# Patient Record
Sex: FEMALE | Race: BLACK OR AFRICAN AMERICAN | NOT HISPANIC OR LATINO | URBAN - METROPOLITAN AREA
[De-identification: names, ages, dates, MRNs, and addresses within clinical notes are randomized per-mention and may not be internally consistent; named-entity substitution may affect disease eponyms.]

---

## 2020-09-14 ENCOUNTER — TELEPHONE ENCOUNTER (OUTPATIENT)
Dept: URBAN - METROPOLITAN AREA CLINIC 118 | Facility: CLINIC | Age: 45
End: 2020-09-14

## 2020-11-04 ENCOUNTER — OFFICE VISIT (OUTPATIENT)
Dept: URBAN - METROPOLITAN AREA CLINIC 118 | Facility: CLINIC | Age: 45
End: 2020-11-04
Payer: COMMERCIAL

## 2020-11-04 ENCOUNTER — LAB OUTSIDE AN ENCOUNTER (OUTPATIENT)
Dept: URBAN - METROPOLITAN AREA CLINIC 118 | Facility: CLINIC | Age: 45
End: 2020-11-04

## 2020-11-04 DIAGNOSIS — R10.13 EPIGASTRIC PAIN: ICD-10-CM

## 2020-11-04 DIAGNOSIS — Z12.11 SCREEN FOR COLON CANCER: ICD-10-CM

## 2020-11-04 DIAGNOSIS — K58.9 IBS (IRRITABLE BOWEL SYNDROME)-DIARRHEA: ICD-10-CM

## 2020-11-04 DIAGNOSIS — R11.0 NAUSEA: ICD-10-CM

## 2020-11-04 PROBLEM — 10743008 IRRITABLE BOWEL SYNDROME: Status: ACTIVE | Noted: 2020-11-04

## 2020-11-04 PROCEDURE — 99214 OFFICE O/P EST MOD 30 MIN: CPT | Performed by: INTERNAL MEDICINE

## 2020-11-04 RX ORDER — ALPRAZOLAM 0.5 MG/1
1 TABLET TABLET ORAL TWICE A DAY
Qty: 60 TABLET | Refills: 5 | OUTPATIENT
Start: 2020-11-04

## 2020-11-04 RX ORDER — ELUXADOLINE 100 MG/1
1 TABLET WITH FOOD TABLET, FILM COATED ORAL TWICE A DAY
Qty: 60 TABLET | Refills: 6 | OUTPATIENT
Start: 2020-11-04 | End: 2021-06-02

## 2020-11-04 NOTE — HPI-TODAY'S VISIT:
pt h/o IBS-d stable on Virberzi presents for GI eval. Pt going through menapause and now reprots intermittent unexplained nausea. No obvious food association. Reports also noted occasional epigastric pain. Denies severe gerd, denies dysphagia. Denies nsaids/asa. Of note also has feeling food stuck in back throat before swallowing (told to see ENT). Denies LGI symptoms except chronic loose stool. pt due for colonoscopy.

## 2020-12-22 ENCOUNTER — OFFICE VISIT (OUTPATIENT)
Dept: URBAN - METROPOLITAN AREA SURGERY CENTER 23 | Facility: SURGERY CENTER | Age: 45
End: 2020-12-22

## 2020-12-28 ENCOUNTER — TELEPHONE ENCOUNTER (OUTPATIENT)
Dept: URBAN - METROPOLITAN AREA CLINIC 96 | Facility: CLINIC | Age: 45
End: 2020-12-28

## 2020-12-29 ENCOUNTER — TELEPHONE ENCOUNTER (OUTPATIENT)
Dept: URBAN - METROPOLITAN AREA CLINIC 92 | Facility: CLINIC | Age: 45
End: 2020-12-29

## 2020-12-29 ENCOUNTER — OFFICE VISIT (OUTPATIENT)
Dept: URBAN - METROPOLITAN AREA SURGERY CENTER 23 | Facility: SURGERY CENTER | Age: 45
End: 2020-12-29
Payer: COMMERCIAL

## 2020-12-29 DIAGNOSIS — K22.2 ACQUIRED ESOPHAGEAL RING: ICD-10-CM

## 2020-12-29 DIAGNOSIS — K29.30 CHRONIC SUPERFICIAL GASTRITIS: ICD-10-CM

## 2020-12-29 DIAGNOSIS — Z12.11 COLON CANCER SCREENING: ICD-10-CM

## 2020-12-29 DIAGNOSIS — K22.8 COLUMNAR-LINED ESOPHAGUS: ICD-10-CM

## 2020-12-29 PROCEDURE — 45378 DIAGNOSTIC COLONOSCOPY: CPT | Performed by: INTERNAL MEDICINE

## 2020-12-29 PROCEDURE — G8907 PT DOC NO EVENTS ON DISCHARG: HCPCS | Performed by: INTERNAL MEDICINE

## 2020-12-29 PROCEDURE — 43450 DILATE ESOPHAGUS 1/MULT PASS: CPT | Performed by: INTERNAL MEDICINE

## 2020-12-29 PROCEDURE — 43239 EGD BIOPSY SINGLE/MULTIPLE: CPT | Performed by: INTERNAL MEDICINE

## 2020-12-29 PROCEDURE — G9935 CANC NOT DETECTD DURING SRCN: HCPCS | Performed by: INTERNAL MEDICINE

## 2020-12-29 RX ORDER — ELUXADOLINE 100 MG/1
1 TABLET WITH FOOD TABLET, FILM COATED ORAL TWICE A DAY
Qty: 60 TABLET | Refills: 6 | Status: ACTIVE | COMMUNITY
Start: 2020-11-04 | End: 2021-06-02

## 2020-12-29 RX ORDER — ALPRAZOLAM 0.5 MG/1
1 TABLET TABLET ORAL TWICE A DAY
Qty: 60 TABLET | Refills: 5 | Status: ACTIVE | COMMUNITY
Start: 2020-11-04

## 2020-12-29 RX ORDER — ELUXADOLINE 100 MG/1
1 TABLET WITH FOOD TABLET, FILM COATED ORAL TWICE A DAY
Qty: 60 TABLET | Refills: 6
Start: 2020-11-04

## 2021-02-15 ENCOUNTER — TELEPHONE ENCOUNTER (OUTPATIENT)
Dept: URBAN - METROPOLITAN AREA CLINIC 118 | Facility: CLINIC | Age: 46
End: 2021-02-15

## 2021-02-16 ENCOUNTER — TELEPHONE ENCOUNTER (OUTPATIENT)
Dept: URBAN - METROPOLITAN AREA CLINIC 118 | Facility: CLINIC | Age: 46
End: 2021-02-16

## 2021-02-18 ENCOUNTER — TELEPHONE ENCOUNTER (OUTPATIENT)
Dept: URBAN - METROPOLITAN AREA CLINIC 118 | Facility: CLINIC | Age: 46
End: 2021-02-18

## 2021-02-18 RX ORDER — PROMETHAZINE HYDROCHLORIDE 25 MG/1
1 TABLET AS NEEDED TABLET ORAL EVERY 8 HOURS
Qty: 90 TABLET60 | Refills: 5 | OUTPATIENT
Start: 2021-02-19 | End: 2021-08-18

## 2021-03-25 ENCOUNTER — OUT OF OFFICE VISIT (OUTPATIENT)
Dept: URBAN - METROPOLITAN AREA MEDICAL CENTER 16 | Facility: MEDICAL CENTER | Age: 46
End: 2021-03-25
Payer: COMMERCIAL

## 2021-03-25 DIAGNOSIS — G62.9 NEUROPATHY: ICD-10-CM

## 2021-03-25 DIAGNOSIS — K76.0 HEPATIC STEATOSIS: ICD-10-CM

## 2021-03-25 DIAGNOSIS — R74.8 ABNORMAL ALKALINE PHOSPHATASE TEST: ICD-10-CM

## 2021-03-25 DIAGNOSIS — R74.01 ALT (SGPT) LEVEL RAISED: ICD-10-CM

## 2021-03-25 PROCEDURE — G8427 DOCREV CUR MEDS BY ELIG CLIN: HCPCS | Performed by: PEDIATRICS

## 2021-03-25 PROCEDURE — 99222 1ST HOSP IP/OBS MODERATE 55: CPT | Performed by: PEDIATRICS

## 2021-03-26 ENCOUNTER — OUT OF OFFICE VISIT (OUTPATIENT)
Dept: URBAN - METROPOLITAN AREA MEDICAL CENTER 16 | Facility: MEDICAL CENTER | Age: 46
End: 2021-03-26
Payer: COMMERCIAL

## 2021-03-26 DIAGNOSIS — E53.8 FOLATE DEFICIENCY: ICD-10-CM

## 2021-03-26 DIAGNOSIS — R74.8 ABNORMAL ALKALINE PHOSPHATASE TEST: ICD-10-CM

## 2021-03-26 DIAGNOSIS — F10.10 AA (ALCOHOL ABUSE): ICD-10-CM

## 2021-03-26 DIAGNOSIS — R20.0 NUMBNESS: ICD-10-CM

## 2021-03-26 PROCEDURE — 99232 SBSQ HOSP IP/OBS MODERATE 35: CPT | Performed by: INTERNAL MEDICINE

## 2021-05-19 ENCOUNTER — OUT OF OFFICE VISIT (OUTPATIENT)
Dept: URBAN - METROPOLITAN AREA MEDICAL CENTER 16 | Facility: MEDICAL CENTER | Age: 46
End: 2021-05-19
Payer: COMMERCIAL

## 2021-05-19 ENCOUNTER — TELEPHONE ENCOUNTER (OUTPATIENT)
Dept: URBAN - METROPOLITAN AREA CLINIC 92 | Facility: CLINIC | Age: 46
End: 2021-05-19

## 2021-05-19 DIAGNOSIS — R74.8 ABNORMAL ALKALINE PHOSPHATASE TEST: ICD-10-CM

## 2021-05-19 DIAGNOSIS — K82.8 ADENOMYOSIS OF GALLBLADDER: ICD-10-CM

## 2021-05-19 DIAGNOSIS — R10.84 ABDOMINAL CRAMPING, GENERALIZED: ICD-10-CM

## 2021-05-19 DIAGNOSIS — K76.0 HEPATIC STEATOSIS: ICD-10-CM

## 2021-05-19 DIAGNOSIS — K59.09 CHRONIC CONSTIPATION: ICD-10-CM

## 2021-05-19 PROCEDURE — 99232 SBSQ HOSP IP/OBS MODERATE 35: CPT | Performed by: PEDIATRICS

## 2021-05-19 PROCEDURE — 99222 1ST HOSP IP/OBS MODERATE 55: CPT | Performed by: PEDIATRICS

## 2021-05-19 PROCEDURE — G8427 DOCREV CUR MEDS BY ELIG CLIN: HCPCS | Performed by: PEDIATRICS

## 2021-05-19 RX ORDER — ALPRAZOLAM 0.5 MG/1
1 TABLET TABLET ORAL TWICE A DAY
Qty: 60 TABLET | Refills: 5
Start: 2020-11-04

## 2021-05-27 ENCOUNTER — OUT OF OFFICE VISIT (OUTPATIENT)
Dept: URBAN - METROPOLITAN AREA MEDICAL CENTER 16 | Facility: MEDICAL CENTER | Age: 46
End: 2021-05-27
Payer: COMMERCIAL

## 2021-05-27 DIAGNOSIS — R14.0 ABDOMINAL BLOATING: ICD-10-CM

## 2021-05-27 DIAGNOSIS — K76.0 HEPATIC STEATOSIS: ICD-10-CM

## 2021-05-27 DIAGNOSIS — R74.8 ABNORMAL ALKALINE PHOSPHATASE TEST: ICD-10-CM

## 2021-05-27 DIAGNOSIS — R60.0 LOWER EXTREMITY EDEMA: ICD-10-CM

## 2021-05-27 PROCEDURE — 99222 1ST HOSP IP/OBS MODERATE 55: CPT | Performed by: PEDIATRICS

## 2021-05-27 PROCEDURE — G8427 DOCREV CUR MEDS BY ELIG CLIN: HCPCS | Performed by: PEDIATRICS

## 2021-08-27 ENCOUNTER — OFFICE VISIT (OUTPATIENT)
Dept: URBAN - METROPOLITAN AREA CLINIC 25 | Facility: CLINIC | Age: 46
End: 2021-08-27

## 2021-08-27 RX ORDER — ELUXADOLINE 100 MG/1
1 TABLET WITH FOOD TABLET, FILM COATED ORAL TWICE A DAY
Qty: 60 TABLET | Refills: 6 | Status: ACTIVE | COMMUNITY
Start: 2020-11-04

## 2021-08-27 RX ORDER — ALPRAZOLAM 0.5 MG/1
1 TABLET TABLET ORAL TWICE A DAY
Qty: 60 TABLET | Refills: 5 | Status: ACTIVE | COMMUNITY
Start: 2020-11-04

## 2021-08-27 NOTE — HPI-TODAY'S VISIT:
August 2021 visit: Patient was seen in May 2021 for abdominal distention, felt to have hepatomegaly, alcoholic fatty infiltration of the liver.  Negative EGD: Medium sized hiatal hernia, Schatzki's ring dilated and colonoscopy: Fair prep, no polyps, repeat advised in 10 years with Dr. Jean in 2020.  Total bilirubin 2.2, alkaline phosphatase 311, elevated AST more than ALT, hemoglobin 10, WBC 19.5, platelets 585, creatinine normal. Abdominal ultrasound from May 2021 revealed small volume ascites, fatty liver, enlarged liver  CT abdomen pelvis without contrast no acute findings, fatty liver.  Abdominal ultrasound gallbladder sludge.  HIDA scan possible chronic cholecystitis or biliary dyskinesia.

## 2021-11-09 ENCOUNTER — OUT OF OFFICE VISIT (OUTPATIENT)
Dept: URBAN - METROPOLITAN AREA MEDICAL CENTER 39 | Facility: MEDICAL CENTER | Age: 46
End: 2021-11-09
Payer: COMMERCIAL

## 2021-11-09 DIAGNOSIS — R10.13 ABDOMINAL DISCOMFORT, EPIGASTRIC: ICD-10-CM

## 2021-11-09 DIAGNOSIS — K70.31 ALCOHOLIC CIRRHOSIS OF LIVER WITH ASCITES: ICD-10-CM

## 2021-11-09 DIAGNOSIS — D64.89 ANEMIA DUE TO OTHER CAUSE: ICD-10-CM

## 2021-11-09 DIAGNOSIS — R19.5 ABNORMAL CONSISTENCY OF STOOL: ICD-10-CM

## 2021-11-09 PROCEDURE — G8427 DOCREV CUR MEDS BY ELIG CLIN: HCPCS | Performed by: NURSE PRACTITIONER

## 2021-11-09 PROCEDURE — 99223 1ST HOSP IP/OBS HIGH 75: CPT | Performed by: NURSE PRACTITIONER

## 2021-11-09 PROCEDURE — 99233 SBSQ HOSP IP/OBS HIGH 50: CPT | Performed by: NURSE PRACTITIONER

## 2021-11-10 ENCOUNTER — OUT OF OFFICE VISIT (OUTPATIENT)
Dept: URBAN - METROPOLITAN AREA MEDICAL CENTER 39 | Facility: MEDICAL CENTER | Age: 46
End: 2021-11-10
Payer: COMMERCIAL

## 2021-11-10 DIAGNOSIS — D50.9 ANEMIA: ICD-10-CM

## 2021-11-10 DIAGNOSIS — K29.60 ADENOPAPILLOMATOSIS GASTRICA: ICD-10-CM

## 2021-11-10 PROCEDURE — 43239 EGD BIOPSY SINGLE/MULTIPLE: CPT | Performed by: INTERNAL MEDICINE

## 2021-12-08 ENCOUNTER — TELEPHONE ENCOUNTER (OUTPATIENT)
Dept: URBAN - METROPOLITAN AREA CLINIC 118 | Facility: CLINIC | Age: 46
End: 2021-12-08

## 2022-03-28 ENCOUNTER — OUT OF OFFICE VISIT (OUTPATIENT)
Dept: URBAN - METROPOLITAN AREA MEDICAL CENTER 39 | Facility: MEDICAL CENTER | Age: 47
End: 2022-03-28
Payer: COMMERCIAL

## 2022-03-28 DIAGNOSIS — D64.89 ANEMIA DUE TO OTHER CAUSE: ICD-10-CM

## 2022-03-28 DIAGNOSIS — A04.72 C. DIFFICILE: ICD-10-CM

## 2022-03-28 DIAGNOSIS — K70.31 ALCOHOLIC CIRRHOSIS OF LIVER WITH ASCITES: ICD-10-CM

## 2022-03-28 DIAGNOSIS — E87.1 ACUTE HYPONATREMIA: ICD-10-CM

## 2022-03-28 PROCEDURE — 99233 SBSQ HOSP IP/OBS HIGH 50: CPT | Performed by: NURSE PRACTITIONER

## 2022-03-28 PROCEDURE — G8427 DOCREV CUR MEDS BY ELIG CLIN: HCPCS | Performed by: NURSE PRACTITIONER

## 2022-03-28 PROCEDURE — 99222 1ST HOSP IP/OBS MODERATE 55: CPT | Performed by: NURSE PRACTITIONER

## 2022-04-07 ENCOUNTER — OUT OF OFFICE VISIT (OUTPATIENT)
Dept: URBAN - METROPOLITAN AREA MEDICAL CENTER 8 | Facility: MEDICAL CENTER | Age: 47
End: 2022-04-07
Payer: COMMERCIAL

## 2022-04-07 DIAGNOSIS — K70.31 ALCOHOLIC CIRRHOSIS OF LIVER WITH ASCITES: ICD-10-CM

## 2022-04-07 PROCEDURE — 99222 1ST HOSP IP/OBS MODERATE 55: CPT | Performed by: PHYSICIAN ASSISTANT

## 2022-04-07 PROCEDURE — G8427 DOCREV CUR MEDS BY ELIG CLIN: HCPCS | Performed by: PHYSICIAN ASSISTANT

## 2022-04-07 PROCEDURE — 99232 SBSQ HOSP IP/OBS MODERATE 35: CPT | Performed by: PHYSICIAN ASSISTANT

## 2022-04-11 ENCOUNTER — OUT OF OFFICE VISIT (OUTPATIENT)
Dept: URBAN - METROPOLITAN AREA MEDICAL CENTER 8 | Facility: MEDICAL CENTER | Age: 47
End: 2022-04-11
Payer: COMMERCIAL

## 2022-04-11 DIAGNOSIS — J96.01 ACUTE RESPIRATORY FAILURE WITH HYPOXIA: ICD-10-CM

## 2022-04-11 DIAGNOSIS — K70.31 ALCOHOLIC CIRRHOSIS OF LIVER WITH ASCITES: ICD-10-CM

## 2022-04-11 DIAGNOSIS — A04.72 C. DIFFICILE: ICD-10-CM

## 2022-04-11 PROCEDURE — 99232 SBSQ HOSP IP/OBS MODERATE 35: CPT | Performed by: PHYSICIAN ASSISTANT

## 2022-04-16 ENCOUNTER — OUT OF OFFICE VISIT (OUTPATIENT)
Dept: URBAN - METROPOLITAN AREA MEDICAL CENTER 8 | Facility: MEDICAL CENTER | Age: 47
End: 2022-04-16
Payer: COMMERCIAL

## 2022-04-16 DIAGNOSIS — J96.01 ACUTE RESPIRATORY FAILURE WITH HYPOXIA: ICD-10-CM

## 2022-04-16 DIAGNOSIS — R18.8 ABDOMINAL ASCITES: ICD-10-CM

## 2022-04-16 DIAGNOSIS — A04.72 C. DIFFICILE: ICD-10-CM

## 2022-04-16 DIAGNOSIS — K74.60 ADVANCED CIRRHOSIS: ICD-10-CM

## 2022-04-16 PROCEDURE — 99233 SBSQ HOSP IP/OBS HIGH 50: CPT | Performed by: INTERNAL MEDICINE

## 2022-04-18 ENCOUNTER — OUT OF OFFICE VISIT (OUTPATIENT)
Dept: URBAN - METROPOLITAN AREA MEDICAL CENTER 8 | Facility: MEDICAL CENTER | Age: 47
End: 2022-04-18
Payer: COMMERCIAL

## 2022-04-18 DIAGNOSIS — R18.8 ABDOMINAL ASCITES: ICD-10-CM

## 2022-04-18 DIAGNOSIS — A04.72 C. DIFFICILE: ICD-10-CM

## 2022-04-18 DIAGNOSIS — K74.60 ADVANCED CIRRHOSIS: ICD-10-CM

## 2022-04-18 PROCEDURE — 99232 SBSQ HOSP IP/OBS MODERATE 35: CPT | Performed by: PHYSICIAN ASSISTANT

## 2022-04-21 ENCOUNTER — OUT OF OFFICE VISIT (OUTPATIENT)
Dept: URBAN - METROPOLITAN AREA MEDICAL CENTER 8 | Facility: MEDICAL CENTER | Age: 47
End: 2022-04-21
Payer: COMMERCIAL

## 2022-04-21 DIAGNOSIS — K74.60 ADVANCED CIRRHOSIS: ICD-10-CM

## 2022-04-21 DIAGNOSIS — R18.8 ABDOMINAL ASCITES: ICD-10-CM

## 2022-04-21 DIAGNOSIS — A04.72 C. DIFFICILE: ICD-10-CM

## 2022-04-21 PROCEDURE — 99232 SBSQ HOSP IP/OBS MODERATE 35: CPT | Performed by: PHYSICIAN ASSISTANT

## 2022-04-22 ENCOUNTER — OUT OF OFFICE VISIT (OUTPATIENT)
Dept: URBAN - METROPOLITAN AREA MEDICAL CENTER 8 | Facility: MEDICAL CENTER | Age: 47
End: 2022-04-22
Payer: COMMERCIAL

## 2022-04-22 DIAGNOSIS — K74.60 ADVANCED CIRRHOSIS: ICD-10-CM

## 2022-04-22 DIAGNOSIS — R18.8 ABDOMINAL ASCITES: ICD-10-CM

## 2022-04-22 DIAGNOSIS — A04.72 C. DIFFICILE: ICD-10-CM

## 2022-04-22 PROCEDURE — 99232 SBSQ HOSP IP/OBS MODERATE 35: CPT | Performed by: INTERNAL MEDICINE

## 2022-06-17 ENCOUNTER — OFFICE VISIT (OUTPATIENT)
Dept: URBAN - METROPOLITAN AREA CLINIC 98 | Facility: CLINIC | Age: 47
End: 2022-06-17

## 2023-03-21 ENCOUNTER — OFFICE VISIT (OUTPATIENT)
Dept: URBAN - METROPOLITAN AREA CLINIC 98 | Facility: CLINIC | Age: 48
End: 2023-03-21

## 2023-03-21 PROBLEM — 420054005: Status: ACTIVE | Noted: 2023-03-21

## 2023-03-21 RX ORDER — ELUXADOLINE 100 MG/1
1 TABLET WITH FOOD TABLET, FILM COATED ORAL TWICE A DAY
Qty: 60 TABLET | Refills: 6 | Status: ACTIVE | COMMUNITY
Start: 2020-11-04

## 2023-03-21 RX ORDER — ALPRAZOLAM 0.5 MG/1
1 TABLET TABLET ORAL TWICE A DAY
Qty: 60 TABLET | Refills: 5 | Status: ACTIVE | COMMUNITY
Start: 2020-11-04

## 2023-03-21 NOTE — HPI-OTHER HISTORIES
3/2023 [place] Narrative & Impression EXAM: CT ABDOMEN PELVIS WO IV CONTRAST   CLINICAL INDICATION: Ascites.   TECHNIQUE: Noncontrast images were obtained through the abdomen and pelvis. [id number]   COMPARISON: CT abdomen pelvis dated 11/6/2022.   FINDINGS: Please note that the anterior aspect of the upper abdomen is excluded from the field-of-view.   Lower Thorax: Normal.   Liver: Cirrhotic liver morphology with macronodular contour and lobar redistribution. Hepatomegaly.   Gallbladder/Biliary Tree: Normal.   Spleen: Normal.   Pancreas: Normal.   Adrenal Glands: Normal.   Kidneys/Ureters: Punctate nonobstructive renal calculus in the right lower pole. No hydronephrosis.   Gastrointestinal: No bowel obstruction. Multiple small bowel loops positioned against the anterior abdominal wall.   Bladder: Normal.   Uterus/Adnexa: Normal.   Lymph Nodes: Normal.   Vessels: Normal.   Peritoneum/Retroperitoneum: Normal. Specifically, no ascites.   Bones/Soft Tissues: Postsurgical changes of umbilical hernia repair with soft tissue thickening along the incision measuring up to 2.6 cm in diameter. No significant surrounding inflammatory fat stranding.    New small fat-containing left para midline supraumbilical hernia with neck measuring 8 mm (5:51).    Small fat-containing right inguinal hernia.   No acute osseous abnormality. 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   IMPRESSION: 1.  No ascites.   2.  Postsurgical changes of umbilical hernia repair with soft tissue thickening along the incision, likely scar tissue. Correlate with physical exam. No significant inflammatory stranding about the incision.   3.  New small fat-containing left supraumbilical hernia.

## 2023-03-21 NOTE — HPI-TODAY'S VISIT:
Pt with Etoh hepatitis w likely progression to cirrhosis Seen by Dr Bee Barrientos at [facility]  Seen by myself at [facility] 2022 when she presented with hyponatremia  HAd multiple admissions for LVP  Had an  abdominal hernia that was repaired w ventral hernia w mesh 11/6/2022

## 2023-06-22 ENCOUNTER — LAB OUTSIDE AN ENCOUNTER (OUTPATIENT)
Dept: URBAN - METROPOLITAN AREA CLINIC 98 | Facility: CLINIC | Age: 48
End: 2023-06-22

## 2023-06-22 ENCOUNTER — WEB ENCOUNTER (OUTPATIENT)
Dept: URBAN - METROPOLITAN AREA CLINIC 98 | Facility: CLINIC | Age: 48
End: 2023-06-22

## 2023-06-22 ENCOUNTER — OFFICE VISIT (OUTPATIENT)
Dept: URBAN - METROPOLITAN AREA CLINIC 98 | Facility: CLINIC | Age: 48
End: 2023-06-22
Payer: COMMERCIAL

## 2023-06-22 VITALS
BODY MASS INDEX: 29.8 KG/M2 | WEIGHT: 151.8 LBS | HEART RATE: 102 BPM | DIASTOLIC BLOOD PRESSURE: 89 MMHG | SYSTOLIC BLOOD PRESSURE: 139 MMHG | TEMPERATURE: 96.8 F | HEIGHT: 60 IN

## 2023-06-22 DIAGNOSIS — K43.9 ABDOMINAL WALL HERNIA: ICD-10-CM

## 2023-06-22 DIAGNOSIS — K70.31 ALCOHOLIC CIRRHOSIS OF LIVER WITH ASCITES: ICD-10-CM

## 2023-06-22 PROCEDURE — 99214 OFFICE O/P EST MOD 30 MIN: CPT | Performed by: INTERNAL MEDICINE

## 2023-06-22 RX ORDER — OXYCODONE HYDROCHLORIDE 5 MG/1
1 TABLET AS NEEDED TABLET ORAL
Status: ACTIVE | COMMUNITY

## 2023-06-22 RX ORDER — DEXLANSOPRAZOLE 60 MG/1
1 CAPSULE CAPSULE, DELAYED RELEASE ORAL ONCE A DAY
Status: ACTIVE | COMMUNITY

## 2023-06-22 RX ORDER — ELUXADOLINE 100 MG/1
1 TABLET WITH FOOD TABLET, FILM COATED ORAL TWICE A DAY
Qty: 60 TABLET | Refills: 6 | Status: ON HOLD | COMMUNITY
Start: 2020-11-04

## 2023-06-22 RX ORDER — ALPRAZOLAM 0.5 MG/1
1 TABLET TABLET ORAL TWICE A DAY
Qty: 60 TABLET | Refills: 5 | Status: ON HOLD | COMMUNITY
Start: 2020-11-04

## 2023-06-22 NOTE — HPI-TODAY'S VISIT:
Seen by myself in past during hosp stay at [facility] in Mar 2022  Pt was being followed by Dr Barrientos w Etoh hepatitis w cirrhosis , with weekly paracentesis complicated by hyponatremia 113 : improved w fluid restriction  in April 2022 -she was admited to [facility] with C diff colitis and sepsis  last LVP Aug 2022 feels very full and tight  worried about her hernia  still drinking glass of wine every other day    3/2023 EXAM: CT ABDOMEN PELVIS WO IV CONTRAST   CLINICAL INDICATION: Ascites.   TECHNIQUE: Noncontrast images were obtained through the abdomen and pelvis. [id number]   COMPARISON: CT abdomen pelvis dated 11/6/2022.   FINDINGS: Please note that the anterior aspect of the upper abdomen is excluded from the field-of-view.   Lower Thorax: Normal.   Liver: Cirrhotic liver morphology with macronodular contour and lobar redistribution. Hepatomegaly.   Gallbladder/Biliary Tree: Normal.   Spleen: Normal.   Pancreas: Normal.   Adrenal Glands: Normal.   Kidneys/Ureters: Punctate nonobstructive renal calculus in the right lower pole. No hydronephrosis.   Gastrointestinal: No bowel obstruction. Multiple small bowel loops positioned against the anterior abdominal wall.   Bladder: Normal.   Uterus/Adnexa: Normal.   Lymph Nodes: Normal.   Vessels: Normal.   Peritoneum/Retroperitoneum: Normal. Specifically, no ascites.   Bones/Soft Tissues: Postsurgical changes of umbilical hernia repair with soft tissue thickening along the incision measuring up to 2.6 cm in diameter. No significant surrounding inflammatory fat stranding.    New small fat-containing left para midline supraumbilical hernia with neck measuring 8 mm (5:51).    Small fat-containing right inguinal hernia.   No acute osseous abnormality. 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   IMPRESSION: 1.  No ascites.   2.  Postsurgical changes of umbilical hernia repair with soft tissue thickening along the incision, likely scar tissue. Correlate with physical exam. No significant inflammatory stranding about the incision.   3.  New small fat-containing left supraumbilical hernia.

## 2023-06-23 LAB
A/G RATIO: 1.3
AFP, SERUM, TUMOR MARKER: 7.8
ALBUMIN: 4.4
ALKALINE PHOSPHATASE: 268
ALT (SGPT): 83
AST (SGOT): 104
BASO (ABSOLUTE): 0.1
BASOS: 1
BILIRUBIN, TOTAL: 0.6
BUN/CREATININE RATIO: 7
BUN: 3
CALCIUM: 8.9
CARBON DIOXIDE, TOTAL: 23
CHLORIDE: 96
CREATININE: 0.44
EGFR: 119
EOS (ABSOLUTE): 0.1
EOS: 1
GLOBULIN, TOTAL: 3.3
GLUCOSE: 95
HEMATOCRIT: 47
HEMATOLOGY COMMENTS:: (no result)
HEMOGLOBIN: 15.4
IMMATURE CELLS: (no result)
IMMATURE GRANS (ABS): 0
IMMATURE GRANULOCYTES: 0
INR: 1.1
LYMPHS (ABSOLUTE): 2.1
LYMPHS: 18
MCH: 31.8
MCHC: 32.8
MCV: 97
MONOCYTES(ABSOLUTE): 0.8
MONOCYTES: 7
NEUTROPHILS (ABSOLUTE): 8.7
NEUTROPHILS: 73
NRBC: (no result)
PLATELETS: 303
POTASSIUM: 3.8
PROTEIN, TOTAL: 7.7
PROTHROMBIN TIME: 10.9
RBC: 4.84
RDW: 13.2
SODIUM: 136
WBC: 11.7

## 2023-06-26 ENCOUNTER — TELEPHONE ENCOUNTER (OUTPATIENT)
Dept: URBAN - METROPOLITAN AREA CLINIC 98 | Facility: CLINIC | Age: 48
End: 2023-06-26

## 2023-06-26 ENCOUNTER — DASHBOARD ENCOUNTERS (OUTPATIENT)
Age: 48
End: 2023-06-26

## 2023-08-07 ENCOUNTER — TELEPHONE ENCOUNTER (OUTPATIENT)
Dept: URBAN - METROPOLITAN AREA CLINIC 98 | Facility: CLINIC | Age: 48
End: 2023-08-07

## 2023-08-18 ENCOUNTER — TELEPHONE ENCOUNTER (OUTPATIENT)
Dept: URBAN - METROPOLITAN AREA CLINIC 98 | Facility: CLINIC | Age: 48
End: 2023-08-18

## 2025-02-03 ENCOUNTER — OFFICE VISIT (OUTPATIENT)
Dept: URBAN - METROPOLITAN AREA CLINIC 98 | Facility: CLINIC | Age: 50
End: 2025-02-03